# Patient Record
Sex: MALE | Race: OTHER | HISPANIC OR LATINO | Employment: FULL TIME | URBAN - NONMETROPOLITAN AREA
[De-identification: names, ages, dates, MRNs, and addresses within clinical notes are randomized per-mention and may not be internally consistent; named-entity substitution may affect disease eponyms.]

---

## 2018-04-06 ENCOUNTER — HOSPITAL ENCOUNTER (EMERGENCY)
Facility: HOSPITAL | Age: 23
Discharge: HOME | End: 2018-04-06
Admitting: EMERGENCY MEDICINE
Payer: COMMERCIAL

## 2018-04-06 VITALS
DIASTOLIC BLOOD PRESSURE: 58 MMHG | OXYGEN SATURATION: 100 % | HEART RATE: 62 BPM | SYSTOLIC BLOOD PRESSURE: 108 MMHG | RESPIRATION RATE: 18 BRPM | TEMPERATURE: 98.3 F | WEIGHT: 81.25 LBS

## 2018-04-06 DIAGNOSIS — R82.4 KETONURIA: ICD-10-CM

## 2018-04-06 DIAGNOSIS — E11.9 DIABETES MELLITUS, NEW ONSET (HCC): ICD-10-CM

## 2018-04-06 DIAGNOSIS — R81 GLUCOSURIA: ICD-10-CM

## 2018-04-06 DIAGNOSIS — R73.9 HYPERGLYCEMIA: Primary | ICD-10-CM

## 2018-04-06 LAB
ACETONE SERPL-MCNC: NEGATIVE MG/DL
ALBUMIN SERPL BCP-MCNC: 4.6 G/DL (ref 3.5–5)
ALP SERPL-CCNC: 107 U/L (ref 46–116)
ALT SERPL W P-5'-P-CCNC: 36 U/L (ref 12–78)
ANION GAP SERPL CALCULATED.3IONS-SCNC: 9 MMOL/L (ref 4–13)
APTT PPP: 28 SECONDS (ref 23–35)
AST SERPL W P-5'-P-CCNC: 14 U/L (ref 5–45)
ATRIAL RATE: 62 BPM
BACTERIA UR QL AUTO: ABNORMAL /HPF
BASE EX.OXY STD BLDV CALC-SCNC: 44.3 % (ref 60–80)
BASE EXCESS BLDV CALC-SCNC: 2.3 MMOL/L
BASOPHILS # BLD AUTO: 0.02 THOUSANDS/ΜL (ref 0–0.1)
BASOPHILS NFR BLD AUTO: 0 % (ref 0–1)
BILIRUB SERPL-MCNC: 1 MG/DL (ref 0.2–1)
BILIRUB UR QL STRIP: NEGATIVE
BUN SERPL-MCNC: 16 MG/DL (ref 5–25)
CALCIUM SERPL-MCNC: 9.8 MG/DL (ref 8.3–10.1)
CHLORIDE SERPL-SCNC: 101 MMOL/L (ref 100–108)
CLARITY UR: CLEAR
CO2 SERPL-SCNC: 31 MMOL/L (ref 21–32)
COLOR UR: YELLOW
CREAT SERPL-MCNC: 1.09 MG/DL (ref 0.6–1.3)
EOSINOPHIL # BLD AUTO: 0.15 THOUSAND/ΜL (ref 0–0.61)
EOSINOPHIL NFR BLD AUTO: 3 % (ref 0–6)
ERYTHROCYTE [DISTWIDTH] IN BLOOD BY AUTOMATED COUNT: 12.1 % (ref 11.6–15.1)
GFR SERPL CREATININE-BSD FRML MDRD: 94 ML/MIN/1.73SQ M
GLUCOSE SERPL-MCNC: 155 MG/DL (ref 65–140)
GLUCOSE SERPL-MCNC: 156 MG/DL (ref 65–140)
GLUCOSE SERPL-MCNC: 183 MG/DL (ref 65–140)
GLUCOSE UR STRIP-MCNC: ABNORMAL MG/DL
HCO3 BLDV-SCNC: 30 MMOL/L (ref 24–30)
HCT VFR BLD AUTO: 42 % (ref 36.5–49.3)
HGB BLD-MCNC: 15.2 G/DL (ref 12–17)
HGB UR QL STRIP.AUTO: NEGATIVE
INR PPP: 1.29 (ref 0.86–1.16)
KETONES UR STRIP-MCNC: ABNORMAL MG/DL
LEUKOCYTE ESTERASE UR QL STRIP: NEGATIVE
LIPASE SERPL-CCNC: 171 U/L (ref 73–393)
LYMPHOCYTES # BLD AUTO: 1.93 THOUSANDS/ΜL (ref 0.6–4.47)
LYMPHOCYTES NFR BLD AUTO: 37 % (ref 14–44)
MAGNESIUM SERPL-MCNC: 2.1 MG/DL (ref 1.6–2.6)
MCH RBC QN AUTO: 29.5 PG (ref 26.8–34.3)
MCHC RBC AUTO-ENTMCNC: 36.2 G/DL (ref 31.4–37.4)
MCV RBC AUTO: 82 FL (ref 82–98)
MONOCYTES # BLD AUTO: 0.34 THOUSAND/ΜL (ref 0.17–1.22)
MONOCYTES NFR BLD AUTO: 7 % (ref 4–12)
MUCOUS THREADS UR QL AUTO: ABNORMAL
NEUTROPHILS # BLD AUTO: 2.8 THOUSANDS/ΜL (ref 1.85–7.62)
NEUTS SEG NFR BLD AUTO: 53 % (ref 43–75)
NITRITE UR QL STRIP: NEGATIVE
NON-SQ EPI CELLS URNS QL MICRO: ABNORMAL /HPF
O2 CT BLDV-SCNC: 9.7 ML/DL
P AXIS: 17 DEGREES
PCO2 BLDV: 59 MM HG (ref 42–50)
PH BLDV: 7.32 [PH] (ref 7.3–7.4)
PH UR STRIP.AUTO: 5.5 [PH] (ref 4.5–8)
PLATELET # BLD AUTO: 219 THOUSANDS/UL (ref 149–390)
PMV BLD AUTO: 11.8 FL (ref 8.9–12.7)
PO2 BLDV: 24.3 MM HG (ref 35–45)
POTASSIUM SERPL-SCNC: 3.8 MMOL/L (ref 3.5–5.3)
PR INTERVAL: 150 MS
PROT SERPL-MCNC: 8.4 G/DL (ref 6.4–8.2)
PROT UR STRIP-MCNC: ABNORMAL MG/DL
PROTHROMBIN TIME: 16 SECONDS (ref 12.1–14.4)
QRS AXIS: 90 DEGREES
QRSD INTERVAL: 104 MS
QT INTERVAL: 410 MS
QTC INTERVAL: 416 MS
RBC # BLD AUTO: 5.15 MILLION/UL (ref 3.88–5.62)
RBC #/AREA URNS AUTO: ABNORMAL /HPF
SODIUM SERPL-SCNC: 141 MMOL/L (ref 136–145)
SP GR UR STRIP.AUTO: >=1.03 (ref 1–1.03)
T WAVE AXIS: 49 DEGREES
TROPONIN I SERPL-MCNC: <0.02 NG/ML
UROBILINOGEN UR QL STRIP.AUTO: 0.2 E.U./DL
VENTRICULAR RATE: 62 BPM
WBC # BLD AUTO: 5.24 THOUSAND/UL (ref 4.31–10.16)
WBC #/AREA URNS AUTO: ABNORMAL /HPF

## 2018-04-06 PROCEDURE — 83735 ASSAY OF MAGNESIUM: CPT | Performed by: PHYSICIAN ASSISTANT

## 2018-04-06 PROCEDURE — 80053 COMPREHEN METABOLIC PANEL: CPT | Performed by: PHYSICIAN ASSISTANT

## 2018-04-06 PROCEDURE — 93005 ELECTROCARDIOGRAM TRACING: CPT | Performed by: PHYSICIAN ASSISTANT

## 2018-04-06 PROCEDURE — 82948 REAGENT STRIP/BLOOD GLUCOSE: CPT

## 2018-04-06 PROCEDURE — 99285 EMERGENCY DEPT VISIT HI MDM: CPT

## 2018-04-06 PROCEDURE — 82805 BLOOD GASES W/O2 SATURATION: CPT | Performed by: PHYSICIAN ASSISTANT

## 2018-04-06 PROCEDURE — 36415 COLL VENOUS BLD VENIPUNCTURE: CPT | Performed by: PHYSICIAN ASSISTANT

## 2018-04-06 PROCEDURE — 85730 THROMBOPLASTIN TIME PARTIAL: CPT | Performed by: PHYSICIAN ASSISTANT

## 2018-04-06 PROCEDURE — 85025 COMPLETE CBC W/AUTO DIFF WBC: CPT | Performed by: PHYSICIAN ASSISTANT

## 2018-04-06 PROCEDURE — 84484 ASSAY OF TROPONIN QUANT: CPT | Performed by: PHYSICIAN ASSISTANT

## 2018-04-06 PROCEDURE — 93010 ELECTROCARDIOGRAM REPORT: CPT | Performed by: INTERNAL MEDICINE

## 2018-04-06 PROCEDURE — 85610 PROTHROMBIN TIME: CPT | Performed by: PHYSICIAN ASSISTANT

## 2018-04-06 PROCEDURE — 83690 ASSAY OF LIPASE: CPT | Performed by: PHYSICIAN ASSISTANT

## 2018-04-06 PROCEDURE — 81001 URINALYSIS AUTO W/SCOPE: CPT | Performed by: PHYSICIAN ASSISTANT

## 2018-04-06 PROCEDURE — 82009 KETONE BODYS QUAL: CPT | Performed by: PHYSICIAN ASSISTANT

## 2018-04-06 NOTE — DISCHARGE INSTRUCTIONS
Diabetes Mellitus Type 2 in Adults, Ambulatory Care   GENERAL INFORMATION:   Diabetes mellitus type 2  is a disease that affects how your body uses glucose (sugar)  Insulin helps move sugar out of the blood so it can be used for energy  Normally, when the blood sugar level increases, the pancreas makes more insulin  Type 2 diabetes develops because either the body cannot make enough insulin, or it cannot use the insulin correctly  After many years, your pancreas may stop making insulin  Common symptoms include the following:   · More hunger or thirst than usual     · Frequent urination     · Weight loss without trying     · Blurred vision  Seek immediate care for the following symptoms:   · Severe abdominal pain, or pain that spreads to your back  You may also be vomiting  · Trouble staying awake or focusing    · Shaking or sweating    · Blurred or double vision    · Breath has a fruity, sweet smell    · Breathing is deep and labored, or rapid and shallow    · Heartbeat is fast and weak  Treatment for diabetes mellitus type 2  includes keeping your blood sugar at a normal level  You must eat the right foods, and exercise regularly  You may also need medicine if you cannot control your blood sugar level with nutrition and exercise  Manage diabetes mellitus type 2:   · Check your blood sugar level  You will be taught how to check a small drop of blood in a glucose monitor  Ask your healthcare provider when and how often to check during the day  Ask your healthcare provider what your blood sugar levels should be when you check them  · Keep track of carbohydrates (sugar and starchy foods)  Your blood sugar level can get too high if you eat too many carbohydrates  Your dietitian will help you plan meals and snacks that have the right amount of carbohydrates  · Eat low-fat foods  Some examples are skinless chicken and low-fat milk  · Eat less sodium (salt)    Some examples of high-sodium foods to limit are soy sauce, potato chips, and soup  Do not add salt to food you cook  Limit your use of table salt  · Eat high-fiber foods  Foods that are a good source of fiber include vegetables, whole grain bread, and beans  · Limit alcohol  Alcohol affects your blood sugar level and can make it harder to manage your diabetes  Women should limit alcohol to 1 drink a day  Men should limit alcohol to 2 drinks a day  A drink of alcohol is 12 ounces of beer, 5 ounces of wine, or 1½ ounces of liquor  · Get regular exercise  Exercise can help keep your blood sugar level steady, decrease your risk of heart disease, and help you lose weight  Exercise for at least 30 minutes, 5 days a week  Include muscle strengthening activities 2 days each week  Work with your healthcare provider to create an exercise plan  · Check your feet each day  for injuries or open sores  Ask your healthcare provider for activities you can do if you have an open sore  · Quit smoking  If you smoke, it is never too late to quit  Smoking can worsen the problems that may occur with diabetes  Ask your healthcare provider for information about how to stop smoking if you are having trouble quitting  · Ask about your weight:  Ask healthcare providers if you need to lose weight, and how much to lose  Ask them to help you with a weight loss program  Even a 10 to 15 pound weight loss can help you manage your blood sugar level  · Carry medical alert identification  Wear medical alert jewelry or carry a card that says you have diabetes  Ask your healthcare provider where to get these items  · Ask about vaccines  Diabetes puts you at risk of serious illness if you get the flu, pneumonia, or hepatitis  Ask your healthcare provider if you should get a flu, pneumonia, or hepatitis B vaccine, and when to get the vaccine    Follow up with your healthcare provider as directed:  Write down your questions so you remember to ask them during your visits  CARE AGREEMENT:   You have the right to help plan your care  Learn about your health condition and how it may be treated  Discuss treatment options with your caregivers to decide what care you want to receive  You always have the right to refuse treatment  The above information is an  only  It is not intended as medical advice for individual conditions or treatments  Talk to your doctor, nurse or pharmacist before following any medical regimen to see if it is safe and effective for you  © 2014 [address] is for End User's use only and may not be sold, redistributed or otherwise used for commercial purposes  All illustrations and images included in CareNotes® are the copyrighted property of [employer]  or [employer]  10% - bad control"> 10% - bad control,Hemoglobin A1c (HbA1c) greater than 10% indicating poor diabetic control,Haemoglobin A1c greater than 10% indicating poor diabetic control">   Diabetes mellitus tipo 2 en adultos, cuidados ambulatorios   INFORMACIÓN GENERAL:   La diabetes mellitus tipo 2 en adultos  es jocelyn enfermedad que afecta la forma en que el cuerpo utiliza la glucosa (azúcar)  La insulina ayuda a extraer el azúcar de la liat para que pueda usarse en la producción de energía  Generalmente, cuando el nivel de azúcar [place], el páncreas produce más insulina  La diabetes tipo 2 se desarrolla ya sea porque el cuerpo no puede producir suficiente insulina, o no la puede usar correctamente  Después de [employer], lopez páncreas podría dejar de producir insulina  Síntomas comunes incluyen los siguientes:   · Más hambre o sed de la usual    · Necesidad frecuente de orinar     · Pérdida de peso sin tratar     · Visión borrosa  Busque cuidados inmediatos para los siguientes síntomas:   · Dolor abdominal severo o dolor que se propaga hacia lopez espalda  Es probable que usted también vomite      · Dificultad para permanecer despierto o concentrado    · Temblores o sudoración    · Visión borrosa o doble    · Aliento con olor a frutas o julius    · Respiración profunda y dificultosa o rápida y superficial    · Ritmo cardíaco rápido y débil  El tratamiento para la diabetes mellitus tipo 2  incluye mantener lopez nivel de azúcar en el liat a un rango normal  Usted debe comer los alimentos correctos y ejercitarse con regularidad  Además es probable que necesite medicamentos si no puede controlar lopez nivel de azúcar en la liat con nutrición y ejercicio  Maneje la diabetes mellitus tipo 2:   · Revise lopez nivel de azúcar en la liat  A usted le enseñarán cómo revisar jocelyn pequeña gota de [place] en un medidor de glucosa  Pregúntele a lopez proveedor de sergey cuándo y con cuánta frecuencia es necesario revisar vandana el día  También pregúntele a lopez proveedor de sergey cuáles deberían ser ana niveles de azúcar en la liat cuando usted se los [place]  · Mantenga un registro de los carbohidratos (azúcares y almidones)  Lopez nivel de azúcar en la liat puede elevarse demasiado si usted come demasiados carbohidratos  Lopez dietista le ayudará a planear comidas y meriendas que tengan la cantidad correcta de carbohidratos  · Consuma alimentos bajos en grasas  Deliah Mola son el melba sin piel y la leche descremada  · Consuma menos sodio (sal)  Algunos ejemplos de alimentos altos en sodio que hay que limitar son la salsa de soya, las dali tostadas y la sopa  No le agregue sal a la comida que usted cocina  Limite lopez uso de sal de robles  · Coma alimentos altos en fibra  Alimentos que son buena jeannie de fibra incluyen los vegetales, el pan integral y los frijoles  · Limite el alcohol  El alcohol afecta lopez nivel de azúcar en la liat y puede dificultar el [place] de lopez diabetes  Las mujeres deben limitar el consumo de alcohol a 1 bebida al día  Los hombres deben limitarlo a 2 debidas al día   Jocelyn bebida de alcohol equivale a 12 onzas de cerveza, 5 onzas de vino o 1½ onzas de licor  · Ejercítese regularmente  El ejercicio puede ayudar a mantener estable lopez nivel de azúcar en la liat, al mismo tiempo que disminuye lopez riesgo de enfermedad cardíaca y le ayuda a perder peso  Ejercítese por al menos 30 minutos, 5 días a la semana  Saralyn Mc de fortalecimiento muscular 2 días a la semana  Colabore con lopez proveedor de sergey para crear un plan de ejercicios  · Revise ana pies a diario  para sesar si tienen heridas o llagas abiertas  Pregúntele a lopez proveedor de [employer] que usted puede hacer si tiene jocelyn llaga abierta  · Deje de fumar  Si usted fuma, nunca es tarde para dejar de hacerlo  El fumar puede [employer] problemas que puedan ocurrir con la diabetes  Pregúntele a lopez proveedor de [employer] para aprender a dejar de fumar si usted tiene dificultad para hacerlo  · Pregunte sobre lopez peso:  Pregúntele a ana proveedores de sergey si usted necesita perder peso y cuánto debe perder  Pídales que le ayuden con un programa de perdida de [place]  Incluso perder unas 10 a 15 libras puede ayudarle a manejar lopez nivel de azúcar en la liat  · Tenga a mano lopez identificación de [employer]  Use un brazalete de alerta médica o lleve consigo jocelyn tarjeta que diga que usted tiene diabetes  Pregúntele a lopez proveedor de sergey dónde conseguir estos artículos  · Pregunte sobre vacunas  La diabetes lo pone a usted en riesgo de enfermedad seria si usted se resfría, tiene neumonía o hepatitis  Pregúntele a lopez proveedor de sergey si usted debe ponerse las vacunas contra la gripe, neumonía o hepatitis B, y cuándo ponérselas  Programe jocelyn olivier con lopez proveedor de [employer] se le haya indicado: Anote ana preguntas para que se acuerde de hacerlas vandana ana visitas  ACUERDOS SOBRE LOPEZ CUIDADO:   Usted tiene el derecho de participar en la planificación de lopez cuidado   Aprenda todo lo que pueda sobre lopez condición y inder darle tratamiento  Discuta con ana médicos ana opciones de tratamiento para juntos decidir el cuidado que usted quiere recibir  Usted siempre tiene el derecho a rechazar lopez tratamiento  Esta información es sólo para uso en educación  Lopez intención no es darle un consejo médico sobre enfermedades o tratamientos  Colsulte con lopez Zelpha Roch farmacéutico antes de seguir cualquier régimen médico para saber si es seguro y efectivo para usted  © 2014 [address] is for End User's use only and may not be sold, redistributed or otherwise used for commercial purposes  All illustrations and images included in CareNotes® are the copyrighted property of [employer] M [employer] Inc  or Miky Snyder

## 2018-04-06 NOTE — ED PROVIDER NOTES
History  Chief Complaint   Patient presents with    Hyperglycemia - Symptomatic     last week bs running in 200's  today fbs  was 400 and pt having blurred vision if voiding frequently  not diabetic to this point     58-year-old previously healthy male presents with elevated blood sugars and signs symptoms of diabetes x1 month  There is a family history of patient's father with type 2 diabetes, patient has been using his father's glucometer to check his blood sugars, has noted blood sugars in the 200 for the past several weeks checking them sporadically not daily  Today when he woke up it was 400 which prompted him to come to ED today  He has never had a low BS reading  Over this time frame he has also developed intermittent blurred vision, severe thirst, urinating frequently and in large volumes  He denies any nausea vomiting or diarrhea  He denies any chest pain or cough  He has no belly pain  He has no dizziness or headaches  He has a dry rash on his neck and shoulders that is mild no wounds particularly on his feet groin or axilla  He does not smoke or drink alcohol  No IV drug use  No current meds  History provided by:  Patient  Hyperglycemia - Symptomatic   Associated symptoms: fatigue, increased thirst and polyuria    Associated symptoms: no abdominal pain, no chest pain, no diaphoresis, no dizziness, no dysuria, no fever, no nausea, no shortness of breath, no vomiting and no weakness        None       History reviewed  No pertinent past medical history  History reviewed  No pertinent surgical history  History reviewed  No pertinent family history  I have reviewed and agree with the history as documented  Social History   Substance Use Topics    Smoking status: Never Smoker    Smokeless tobacco: Never Used    Alcohol use Yes      Comment: ocassional        Review of Systems   Constitutional: Positive for fatigue   Negative for activity change, appetite change, chills, diaphoresis, fever and unexpected weight change  HENT: Negative for congestion, ear pain, rhinorrhea, sinus pressure, sore throat and tinnitus  Eyes: Negative for visual disturbance  Respiratory: Negative for cough, chest tightness, shortness of breath and wheezing  Cardiovascular: Negative for chest pain, palpitations and leg swelling  Gastrointestinal: Negative for abdominal pain, constipation, diarrhea, nausea and vomiting  Endocrine: Positive for polydipsia, polyphagia and polyuria  Negative for cold intolerance and heat intolerance  Genitourinary: Negative for decreased urine volume, difficulty urinating, dysuria, flank pain, frequency, hematuria and urgency  Musculoskeletal: Negative for arthralgias, back pain and myalgias  Skin: Negative for rash and wound  Neurological: Negative for dizziness, tremors, syncope, weakness and headaches  Physical Exam  ED Triage Vitals [04/06/18 1430]   Temperature Pulse Respirations Blood Pressure SpO2   98 3 °F (36 8 °C) 72 16 134/70 100 %      Temp Source Heart Rate Source Patient Position - Orthostatic VS BP Location FiO2 (%)   Temporal Right Sitting Right arm --      Pain Score       No Pain           Orthostatic Vital Signs  Vitals:    04/06/18 1430 04/06/18 1745   BP: 134/70 108/58   Pulse: 72 62   Patient Position - Orthostatic VS: Sitting Lying       Physical Exam   Constitutional: He is oriented to person, place, and time  Vital signs are normal  He appears well-developed and well-nourished  Non-toxic appearance  No distress  HENT:   Head: Normocephalic and atraumatic  Right Ear: Tympanic membrane and external ear normal    Left Ear: Tympanic membrane and external ear normal    Nose: Nose normal  No rhinorrhea  Mouth/Throat: Uvula is midline and oropharynx is clear and moist    Eyes: Conjunctivae, EOM and lids are normal  Pupils are equal, round, and reactive to light  Right eye exhibits no discharge  Left eye exhibits no discharge  Neck: Normal range of motion and full passive range of motion without pain  Neck supple  No JVD present  No thyromegaly present  Cardiovascular: Normal rate, regular rhythm, normal heart sounds and intact distal pulses  No murmur heard  Pulmonary/Chest: Effort normal and breath sounds normal  No accessory muscle usage  No tachypnea  No respiratory distress  He has no wheezes  He has no rales  He exhibits no tenderness  Abdominal: Soft  Normal appearance and bowel sounds are normal  He exhibits no distension  There is no hepatosplenomegaly  There is no tenderness  There is no rebound and no CVA tenderness  No hernia  Musculoskeletal: Normal range of motion  He exhibits no edema or tenderness  Lymphadenopathy:     He has no cervical adenopathy  Neurological: He is alert and oriented to person, place, and time  No cranial nerve deficit or sensory deficit  Skin: Skin is warm, dry and intact  Capillary refill takes less than 2 seconds  Rash (mild acanthosis nape of neck, slightly raised brown/red, dry) noted  He is not diaphoretic  No erythema  No pallor  Psychiatric: He has a normal mood and affect  His speech is normal and behavior is normal    Nursing note and vitals reviewed        ED Medications  Medications - No data to display    Diagnostic Studies  Results Reviewed     Procedure Component Value Units Date/Time    Comprehensive metabolic panel [[id number]]  (Abnormal) Collected:  04/06/18 1716    Lab Status:  Final result Specimen:  Blood from Arm, Right Updated:  04/06/18 6670     Sodium 141 mmol/L      Potassium 3 8 mmol/L      Chloride 101 mmol/L      CO2 31 mmol/L      Anion Gap 9 mmol/L      BUN 16 mg/dL      Creatinine 1 09 mg/dL      Glucose 155 (H) mg/dL      Calcium 9 8 mg/dL      AST 14 U/L      ALT 36 U/L      Alkaline Phosphatase 107 U/L      Total Protein 8 4 (H) g/dL      Albumin 4 6 g/dL      Total Bilirubin 1 00 mg/dL      eGFR 94 ml/min/1 73sq m     Narrative:         Consolidated [employer] [facility] Education [facility] recommendations are as follows:  GFR calculation is accurate only with a steady state creatinine  Chronic Kidney disease less than 60 ml/min/1 73 sq  meters  Kidney failure less than 15 ml/min/1 73 sq  meters  Lipase [[id number]]  (Normal) Collected:  04/06/18 1716    Lab Status:  Final result Specimen:  Blood from Arm, Right Updated:  04/06/18 1753     Lipase 171 u/L     Magnesium [[id number]]  (Normal) Collected:  04/06/18 1716    Lab Status:  Final result Specimen:  Blood from Arm, Right Updated:  04/06/18 1753     Magnesium 2 1 mg/dL     Troponin I [[id number]]  (Normal) Collected:  04/06/18 1716    Lab Status:  Final result Specimen:  Blood from Arm, Right Updated:  04/06/18 1747     Troponin I <0 02 ng/mL     Narrative:         [employer] Chemistry analyzer 99% cutoff is > 0 04 ng/mL in network labs    o cTnI 99% cutoff is useful only when applied to patients in the clinical setting of myocardial ischemia  o cTnI 99% cutoff should be interpreted in the context of clinical history, ECG findings and possibly cardiac imaging to establish correct diagnosis  o cTnI 99% cutoff may be suggestive but clearly not indicative of a coronary event without the clinical setting of myocardial ischemia  Urine Microscopic [[id number]]  (Abnormal) Collected:  04/06/18 1717    Lab Status:  Final result Specimen:  Urine from Urine, Clean Catch Updated:  04/06/18 1744     RBC, UA 0-1 (A) /hpf      WBC, UA 0-1 (A) /hpf      Epithelial Cells Occasional /hpf      Bacteria, UA Occasional /hpf      MUCOUS THREADS Moderate    Protime-INR [[id number]]  (Abnormal) Collected:  04/06/18 1716    Lab Status:  Final result Specimen:  Blood from Arm, Right Updated:  04/06/18 1741     Protime 16 0 (H) seconds      INR 1 29 (H)    APTT [[id number]]  (Normal) Collected:  04/06/18 1716    Lab Status:  Final result Specimen:  Blood from Arm, Right Updated:  04/06/18 1741     PTT 28 seconds     Narrative:          Therapeutic Heparin Range = 60-90 seconds    Acetone [[id number]]  (Normal) Collected:  04/06/18 1716    Lab Status:  Final result Specimen:  Blood from Arm, Right Updated:  04/06/18 1737     Acetone, Bld Negative    UA w Reflex to Microscopic w Reflex to Culture [[id number]]  (Abnormal) Collected:  04/06/18 1717    Lab Status:  Final result Specimen:  Urine from Urine, Clean Catch Updated:  04/06/18 1733     Color, UA Yellow     Clarity, UA Clear     Specific Gravity, UA >=1 030     pH, UA 5 5     Leukocytes, UA Negative     Nitrite, UA Negative     Protein, UA Trace (A) mg/dl      Glucose,  (1/2%) (A) mg/dl      Ketones, UA 15 (1+) (A) mg/dl      Urobilinogen, UA 0 2 E U /dl      Bilirubin, UA Negative     Blood, UA Negative    Blood gas, venous [[id number]]  (Abnormal) Collected:  04/06/18 1716    Lab Status:  Final result Specimen:  Blood from Arm, Right Updated:  04/06/18 1731     pH, Faraz 7 324     pCO2, Faraz 59 0 (H) mm Hg      pO2, Faraz 24 3 (L) mm Hg      HCO3, Faraz 30 0 mmol/L      Base Excess, Faraz 2 3 mmol/L      O2 Content, Faraz 9 7 ml/dL      O2 HGB, VENOUS 44 3 (L) %     CBC and differential [[id number]]  (Normal) Collected:  04/06/18 1716    Lab Status:  Final result Specimen:  Blood from Arm, Right Updated:  04/06/18 1729     WBC 5 24 Thousand/uL      RBC 5 15 Million/uL      Hemoglobin 15 2 g/dL      Hematocrit 42 0 %      MCV 82 fL      MCH 29 5 pg      MCHC 36 2 g/dL      RDW 12 1 %      MPV 11 8 fL      Platelets 117 Thousands/uL      Neutrophils Relative 53 %      Lymphocytes Relative 37 %      Monocytes Relative 7 %      Eosinophils Relative 3 %      Basophils Relative 0 %      Neutrophils Absolute 2 80 Thousands/µL      Lymphocytes Absolute 1 93 Thousands/µL      Monocytes Absolute 0 34 Thousand/µL      Eosinophils Absolute 0 15 Thousand/µL      Basophils Absolute 0 02 Thousands/µL     Fingerstick Glucose (POCT) [[id number]]  (Abnormal) Collected:  04/06/18 1722    Lab Status:  Final result Updated:  04/06/18 1725 POC Glucose 156 (H) mg/dl     Fingerstick Glucose (POCT) [[id number]]  (Abnormal) Collected:  04/06/18 1434    Lab Status:  Final result Updated:  04/06/18 1711     POC Glucose 183 (H) mg/dl                  No orders to display              Procedures  ECG 12 Lead Documentation  Date/Time: 4/6/2018 5:40 PM  Performed by: Joseline Smart  Authorized by: Joseline Smart     Indications / Diagnosis:  Hyperglycemia  ECG reviewed by me, the ED Provider: yes    Patient location:  ED  Rate:     ECG rate:  62  Rhythm:     Rhythm: sinus rhythm    Ectopy:     Ectopy: none    QRS:     QRS axis:  Normal  Conduction:     Conduction: normal    ST segments:     ST segments:  Normal  T waves:     T waves: normal             Phone Contacts  ED Phone Contact    ED Course  ED Course as of Apr 06 1956 Fri Apr 06, 2018   1736 POC Glucose: (!) 156   1736 pH, Faraz: 7 324   1736 Color, UA: Yellow   1736 pH, UA: 5 5   1736 Leukocytes, UA: Negative   1736 Nitrite, UA: Negative   1736 Protein, UA: (!) Trace   1736 Glucose, UA: (!) 500 (1/2%)   1736 Ketones, UA: (!) 15 (1+)   1736 Blood, UA: Negative   1736 WBC: 5 24   1736 Hemoglobin: 15 2   1736 Platelets: 986   1411 Magnesium: 2 1   1808 Lipase: 171   1808 Sodium: 141   1808 Potassium: 3 8   1808 Chloride: 101   1808 CO2: 31   1808 Anion Gap: 9   1808 BUN: 16   1808 Creatinine: 1 09   1808 Glucose: (!) 155   1808 eGFR: 94   1808 Acetone, Bld: Negative   1808 Troponin I: <0 02   1818 Delay in IVF due to other critical patients, pt eating and drinking can orally hydrate  Pt would not like to stay for IV hydration  We reviewed together all lab results today, treatment to start on PO metformin  He is to establish care with pcp in the [place] area per his preference  RTED with n/v/d belly pain fevers nonhealing wounds chest pain short of breath or unable to wake  Pt and family understand and agree to plan          MDM  Number of Diagnoses or Management Options  Diabetes mellitus, new onset [facility]): new and requires workup  Glucosuria: new and requires workup  Hyperglycemia: new and requires workup  Ketonuria: new and requires workup  Diagnosis management comments: New onset of hyperglycemia with s/x c/w new onset diabetes  No prior low BS readings  Admits to poor diet "junk food/soda" encouraged to cut unnecessary sugars from his diet  Will start on metformin 500mg bid  Referrals to pcp for close f/u in ~1 week to establish and continue care  No s/sx of dka or acidemia  Is eating drinking normally no pain no other associated electrolyte disturbance  Appropriate for o/p treatment  Amount and/or Complexity of Data Reviewed  Clinical lab tests: ordered and reviewed  Obtain history from someone other than the patient: yes  Discuss the patient with other providers: yes    Patient Progress  Patient progress: stable    CritCare Time    Disposition  Final diagnoses:   Hyperglycemia   Glucosuria   Ketonuria   [facility]     Time reflects when diagnosis was documented in both MDM as applicable and the Disposition within this note     Time User Action Codes Description Comment    4/6/2018  6:20 PM Placido Vaughan [R73 9] Hyperglycemia     4/6/2018  6:20 PM Jodine Bad     4/6/2018  6:20 PM Placido Vaughan [R82 [id number]] Ketonuria     4/6/2018  6:21 PM Katja Hough [E11 9] Diabetes mellitus, new onset [facility])       ED Disposition     ED Disposition Condition Comment    Discharge  [address] discharge to home/self care      Condition at discharge: Good        Follow-up Information     Follow up With Specialties Details Why Contact Info    [employer]  Call To establish [phone]      Gina Dunlap PA-C Physician Assistant Schedule an appointment as soon as possible for a visit in 1 week To establish PCP [address] [place] [address] [id number] [place], [place] [address] Nurse Practitioner Schedule an appointment as soon as possible for a visit in 1 week To establish PCP [facility] [id number]  [phone]          Discharge Medication List as of 4/6/2018  6:49 PM      START taking these medications    Details   metFORMIN (GLUCOPHAGE) 500 mg tablet Take 1 tablet (500 mg total) by mouth 2 (two) times a day with meals for 30 days, Starting Fri 4/6/2018, Until Sun 5/6/2018, Print           No discharge procedures on file      ED Provider  Electronically Signed by           Adriana Nolan PA-C  04/06/18 1956

## 2018-04-26 ENCOUNTER — OFFICE VISIT (OUTPATIENT)
Dept: FAMILY MEDICINE CLINIC | Facility: CLINIC | Age: 23
End: 2018-04-26
Payer: COMMERCIAL

## 2018-04-26 VITALS
WEIGHT: 165 LBS | DIASTOLIC BLOOD PRESSURE: 80 MMHG | HEART RATE: 74 BPM | SYSTOLIC BLOOD PRESSURE: 130 MMHG | OXYGEN SATURATION: 99 % | TEMPERATURE: 97.9 F

## 2018-04-26 DIAGNOSIS — E11.8 TYPE 2 DIABETES MELLITUS WITH COMPLICATION, WITHOUT LONG-TERM CURRENT USE OF INSULIN (HCC): Primary | ICD-10-CM

## 2018-04-26 LAB
GLUCOSE SERPL-MCNC: 312 MG/DL (ref 65–140)
SL AMB  POCT GLUCOSE, UA: ABNORMAL
SL AMB LEUKOCYTE ESTERASE,UA: NEGATIVE
SL AMB POCT BILIRUBIN,UA: NEGATIVE
SL AMB POCT BLOOD,UA: NEGATIVE
SL AMB POCT CLARITY,UA: ABNORMAL
SL AMB POCT COLOR,UA: YELLOW
SL AMB POCT GLUCOSE BLD: 312
SL AMB POCT HEMOGLOBIN AIC: 11.7
SL AMB POCT KETONES,UA: ABNORMAL
SL AMB POCT NITRITE,UA: NEGATIVE
SL AMB POCT PH,UA: 6.5
SL AMB POCT SPECIFIC GRAVITY,UA: 1.01
SL AMB POCT URINE PROTEIN: NEGATIVE
SL AMB POCT UROBILINOGEN: 0.2

## 2018-04-26 PROCEDURE — 99213 OFFICE O/P EST LOW 20 MIN: CPT | Performed by: FAMILY MEDICINE

## 2018-04-26 PROCEDURE — 82948 REAGENT STRIP/BLOOD GLUCOSE: CPT | Performed by: FAMILY MEDICINE

## 2018-04-26 PROCEDURE — 81002 URINALYSIS NONAUTO W/O SCOPE: CPT | Performed by: FAMILY MEDICINE

## 2018-04-26 PROCEDURE — 83036 HEMOGLOBIN GLYCOSYLATED A1C: CPT | Performed by: FAMILY MEDICINE

## 2018-04-26 RX ORDER — BLOOD-GLUCOSE METER
EACH MISCELLANEOUS
Qty: 1 KIT | Refills: 0 | Status: SHIPPED | OUTPATIENT
Start: 2018-04-26 | End: 2021-11-05 | Stop reason: SDUPTHER

## 2018-04-26 NOTE — PROGRESS NOTES
Assessment/Plan:     Diagnoses and all orders for this visit:    Type 2 diabetes mellitus with complication, without long-term current use of insulin (HCC)  -     POCT hemoglobin A1c  -     POCT blood glucose  -     CBC and differential; Future  -     Comprehensive metabolic panel; Future  -     TSH, 3rd generation with T4 reflex; Future  -     Lipid Panel with Direct LDL reflex; Future  -     Cyclic citrul peptide antibody, IgG; Future  -     Insulin, fasting; Future  -     Glutamic acid decarboxylase; Future  -     POCT urine dip  -     Blood Glucose Monitoring Suppl (ONETOUCH VERIO) w/Device KIT; USE AS DIRECTED  -     glucose blood (ONETOUCH VERIO) test strip; Use as instructed Test blood sugars 3 times daily  -     ONETOUCH DELICA LANCETS FINE MISC; USE AS DIRECTED THREE TIMES DAILY    Other orders  -     Fingerstick Glucose (POCT)      Discussion/Plan:  Diabetes - A1C 11 7  No signs/sx of DKA/acidosis  Continue Metformin 500mg BID with meals  Dietary advice given with handouts on diabetic meal planning  Will obtain labs and manage medication once results available  Patient instructed to get labs done tomorrow morning  Educated on DKA signs/symptoms and advised patient to seek ED if signs/sx develop  Blood glucose testing supplies sent to pharmacy  Advised patient to test 3x/daily, records results, and bring to his f/u appointment  - F/U in 1 month or sooner if needed  Will give referral for opthamology and  diabetic foot exam     Subjective:      Patient ID: Chris Byrne is a 23 y o  male  Chief Complaint   Patient presents with   [address] Diabetes     Was diagnosed with diabetes at ER  Patient is a 23year old male who presents to the office today to establish care and for ED f/u  Patient presented to there ED on 4/6/18 with symptomatic hyperglycemia  He had been checking blood sugars at home with his father's glucometer, who has Type 2 Diabetes   He reported that sugars were elevated in 200s sporadically and a reading of 400 prompted ED visit  He was experiencing intermittent blurred vision, excessive thirst and urination  Work up revealed hyperglycemia/new onset type 2 diabetes, no signs of acidosis/DKA  He was discharged on Metformin 500mg BID and was advised to improve diet  Currently, patient lives in [facility] with his mother and brothers  Patient states that he has been monitoring blood sugar at home once daily and they are always running in 300s-400s, with a reading of 495 two days ago  Patient has been taking Metformin 500mg BID  Patient has also changed his diet, he states he is eating vegetables and not eating as much rice/bread  Patient states he is asymptomatic at this time, but sugars are running higher  Patient states that he weighed 179 lbs when he went to the hospital  He is now 165 lbs in the office today  Patient denies medical history of any problems, no surgical history  Patient reports father and uncle have "bad" diabetes  High cholesterol also runs in the family  The following portions of the patient's history were reviewed and updated as appropriate: allergies, current medications, past family history, past medical history, past social history, past surgical history and problem list     Review of Systems   Constitutional: Positive for unexpected weight change  Eyes: Positive for visual disturbance  Respiratory: Negative  Cardiovascular: Negative  Endocrine: Positive for polydipsia and polyuria  Objective:      /80 (BP Location: Left arm, Patient Position: Sitting, Cuff Size: Large)   Pulse 74   Temp 97 9 °F (36 6 °C) (Tympanic)   Wt 74 8 kg (165 lb)   SpO2 99%     BP recheck - 118/80       Physical Exam   Constitutional: He appears well-developed and well-nourished  HENT:   Head: Normocephalic and atraumatic     Right Ear: External ear normal    Left Ear: External ear normal    Nose: Nose normal    Mouth/Throat: Oropharynx is clear and moist    Eyes: Conjunctivae are normal  Pupils are equal, round, and reactive to light  Neck: Neck supple  Cardiovascular: Normal rate and regular rhythm  Pulmonary/Chest: Effort normal and breath sounds normal    Abdominal: Soft  Bowel sounds are normal  There is no tenderness  Neurological: He is alert  Skin: Skin is warm  Psychiatric: He has a normal mood and affect   His behavior is normal

## 2018-05-16 DIAGNOSIS — E11.9 DIABETES MELLITUS, NEW ONSET (HCC): Primary | ICD-10-CM

## 2018-05-17 DIAGNOSIS — E11.9 DIABETES MELLITUS, NEW ONSET (HCC): ICD-10-CM

## 2018-05-17 LAB
CHOLEST SERPL-MCNC: 154 MG/DL (ref 50–200)
CHOLEST/HDLC SERPL: 3.85 {RATIO}
EXT DIFF-ABS BASOPHILS: 0.02
EXT DIFF-ABS EOSINOPHILS: 0.2
EXT DIFF-ABS LYMPHOCYTES: 1.3
EXT DIFF-ABS MONOCYTES: 0.2
EXT DIFF-ABS NEUTROPHILS: NORMAL
EXT GLUCOSE BLD: 219
EXTERNAL ALBUMIN: 5
EXTERNAL ALK PHOS: 116
EXTERNAL ALT: 22
EXTERNAL ANION GAP: ABNORMAL
EXTERNAL AST: 16
EXTERNAL BICARBONATE: ABNORMAL
EXTERNAL BUN: 15
EXTERNAL CALCIUM: 10.2
EXTERNAL CHLORIDE: 98
EXTERNAL CREATININE: 1.06
EXTERNAL EGFR: 91
EXTERNAL HEMATOCRIT: 45 %
EXTERNAL HEMOGLOBIN: 15.1 G/DL
EXTERNAL MCV: 87
EXTERNAL PLATELET COUNT: 193 K/ΜL
EXTERNAL POTASSIUM: 3.9
EXTERNAL RBC: 5.12
EXTERNAL RDW: 29.5
EXTERNAL SODIUM: 136
EXTERNAL T.BILIRUBIN: 1.1
EXTERNAL TOTAL PROTEIN: 7.5
EXTERNAL WBC: 3.9
HDLC SERPL-MCNC: 40 MG/DL (ref 40–60)
INSULIN SERPL-ACNC: 2 MU/L (ref 3–25)
LDL/HDL RATIO (HISTORICAL): 2.33
LDLC SERPL DIRECT ASSAY-MCNC: 93 MG/DL
LDLC SERPL DIRECT ASSAY-MCNC: 94 MG/DL (ref 0–100)
NONHDLC SERPL-MCNC: 114 MG/DL
T4 SERPL-MCNC: 10.28 UG/DL
TRIGL SERPL-MCNC: 103 MG/DL (ref ?–150)
TSH SERPL DL<=0.05 MIU/L-ACNC: 0.49 UIU/ML (ref 0.36–3.74)
VLDLC SERPL-MCNC: 21 MG/DL

## 2018-05-22 DIAGNOSIS — E10.65 TYPE 1 DIABETES MELLITUS WITH HYPERGLYCEMIA (HCC): Primary | ICD-10-CM

## 2018-05-22 DIAGNOSIS — E11.8 TYPE 2 DIABETES MELLITUS WITH COMPLICATION, WITHOUT LONG-TERM CURRENT USE OF INSULIN (HCC): ICD-10-CM

## 2018-05-22 NOTE — PROGRESS NOTES
Labs reviewed with Dr Bj Heredia  Start Lantus 15 units at bedtime daily and Humalog short acting three times daily with meals per sliding scale  Monitor BS four times times daily with meals and at bedtime    Refer to endocrinology for management

## 2018-05-24 ENCOUNTER — OFFICE VISIT (OUTPATIENT)
Dept: FAMILY MEDICINE CLINIC | Facility: CLINIC | Age: 23
End: 2018-05-24
Payer: COMMERCIAL

## 2018-05-24 VITALS
HEIGHT: 72 IN | TEMPERATURE: 98.6 F | OXYGEN SATURATION: 98 % | DIASTOLIC BLOOD PRESSURE: 64 MMHG | SYSTOLIC BLOOD PRESSURE: 115 MMHG | BODY MASS INDEX: 22.48 KG/M2 | HEART RATE: 56 BPM | WEIGHT: 166 LBS | RESPIRATION RATE: 12 BRPM

## 2018-05-24 DIAGNOSIS — E10.9 TYPE 1 DIABETES MELLITUS WITHOUT COMPLICATION (HCC): ICD-10-CM

## 2018-05-24 DIAGNOSIS — E10.65 TYPE 1 DIABETES MELLITUS WITH HYPERGLYCEMIA (HCC): ICD-10-CM

## 2018-05-24 PROCEDURE — 99213 OFFICE O/P EST LOW 20 MIN: CPT | Performed by: FAMILY MEDICINE

## 2018-05-24 NOTE — LETTER
May 24, 2018     Patient: Reynold Adams   YOB: 1995   Date of Visit: 5/24/2018       To Whom it May Concern:    Reynold Adams is under my professional care  He was seen in my office on 5/24/2018  He may return to work on 5/28/18 with limitations  Please excuse patient from work on 5/24/18  I personally explained to patient that it was medically necessary to come to office  Patient is able to return to work on 5/28/18 with restriction  Patient is able to perform duties, but will need to leave work if experiencing hypoglycemia and patient will need to check blood sugar every 4 hours  If you have any questions or concerns, please don't hesitate to call           Sincerely,          Roxanne Oneill PA-C        CC: No Recipients

## 2018-05-24 NOTE — PROGRESS NOTES
Assessment/Plan:     Diagnoses and all orders for this visit:    Type 1 diabetes mellitus with hyperglycemia (HCC)  -     insulin glargine (LANTUS SOLOSTAR) injection pen 100 units/mL; Inject 0 3 mL (30 Units total) under the skin daily at bedtime  -     insulin lispro (HumaLOG) 100 Units/mL SOPN; Inject 10 Units under the skin 3 (three) times a day with meals Inject under the skin three times daily with meals per sliding scale    Type 1 diabetes mellitus without complication (HCC)  -     glucose blood (ONETOUCH VERIO) test strip; Use as instructed Test blood sugars 4 times daily with meals and at bedtime  -     ONETOUCH DELICA LANCETS FINE MISC; USE AS DIRECTED THREE TIMES DAILY       Discussion/Plan:  Type 1 Diabetes with hyperglycemia - start lantus 30 units at bedtime and humalog 10 units TID with meals and sliding scale  Glucose testing supplies sent to pharmacy, test blood glucose 4 times daily  Records glucose readings and bring to next visit  Educated patient on how to inject insulin and test blood glucose  Hypoglycemia signs, symptoms, readings, and management discussed  F/U with endocrinology  RTC in 2 weeks for blood sugar evaluation or sooner if needed  Subjective:      Patient ID: Malcolm Mcdermott is a 23 y o  male  Chief Complaint   Patient presents with    Diabetes     New onset DM, go over how to use insulin       Patient presents to the office today for lab review and counseling on how to use insulin  He is a new onset diabetic, likely type 1  Endocrinology was consulted and insulin was recommended along with referral  Patient states he is feeling good, no complaints/concerns  Patient states sugar was 319 today  He needs documentation for work also  He is eating healthy and exercising           The following portions of the patient's history were reviewed and updated as appropriate: allergies, current medications, past family history, past medical history, past social history, past surgical history and problem list     Review of Systems   Constitutional: Negative  Eyes: Negative  Respiratory: Negative  Cardiovascular: Negative  Gastrointestinal: Negative  Genitourinary: Negative  Musculoskeletal: Negative  Neurological: Negative  Psychiatric/Behavioral: Negative  Objective:      /64 (BP Location: Left arm, Patient Position: Sitting, Cuff Size: Standard)   Pulse 56   Temp 98 6 °F (37 °C) (Tympanic)   Resp 12   Ht 6' (1 829 m)   Wt 75 3 kg (166 lb)   SpO2 98%   BMI 22 51 kg/m²          Physical Exam   Constitutional: He is oriented to person, place, and time  He appears well-developed and well-nourished  HENT:   Head: Normocephalic and atraumatic  Right Ear: Tympanic membrane, external ear and ear canal normal    Left Ear: Tympanic membrane, external ear and ear canal normal    Nose: Nose normal    Mouth/Throat: Oropharynx is clear and moist    Eyes: Conjunctivae are normal  Pupils are equal, round, and reactive to light  Neck: Neck supple  No thyromegaly present  Cardiovascular: Normal rate, regular rhythm and normal heart sounds  Pulmonary/Chest: Effort normal and breath sounds normal    Abdominal: Soft  Bowel sounds are normal  There is no tenderness  Lymphadenopathy:     He has no cervical adenopathy  Neurological: He is alert and oriented to person, place, and time  Skin: Skin is warm and dry  Psychiatric: He has a normal mood and affect   His behavior is normal

## 2018-05-29 DIAGNOSIS — E10.8 TYPE 1 DIABETES MELLITUS WITH COMPLICATION (HCC): Primary | ICD-10-CM

## 2018-06-17 DIAGNOSIS — E10.9 TYPE 1 DIABETES MELLITUS WITHOUT COMPLICATION (HCC): ICD-10-CM

## 2018-06-18 RX ORDER — BLOOD SUGAR DIAGNOSTIC
STRIP MISCELLANEOUS
Qty: 100 EACH | Refills: 1 | Status: SHIPPED | OUTPATIENT
Start: 2018-06-18 | End: 2018-07-11 | Stop reason: SDUPTHER

## 2018-06-22 ENCOUNTER — DOCUMENTATION (OUTPATIENT)
Dept: FAMILY MEDICINE CLINIC | Facility: CLINIC | Age: 23
End: 2018-06-22

## 2018-07-06 ENCOUNTER — OFFICE VISIT (OUTPATIENT)
Dept: FAMILY MEDICINE CLINIC | Facility: CLINIC | Age: 23
End: 2018-07-06
Payer: COMMERCIAL

## 2018-07-06 VITALS
DIASTOLIC BLOOD PRESSURE: 82 MMHG | OXYGEN SATURATION: 99 % | SYSTOLIC BLOOD PRESSURE: 129 MMHG | WEIGHT: 183 LBS | TEMPERATURE: 98.2 F | HEART RATE: 72 BPM | BODY MASS INDEX: 24.82 KG/M2

## 2018-07-06 DIAGNOSIS — Z79.4 OTHER SPECIFIED DIABETES MELLITUS WITH COMPLICATION, WITH LONG-TERM CURRENT USE OF INSULIN: Primary | ICD-10-CM

## 2018-07-06 DIAGNOSIS — E13.8 OTHER SPECIFIED DIABETES MELLITUS WITH COMPLICATION, WITH LONG-TERM CURRENT USE OF INSULIN: Primary | ICD-10-CM

## 2018-07-06 PROBLEM — E11.9 DIABETES MELLITUS (HCC): Status: ACTIVE | Noted: 2018-07-06

## 2018-07-06 PROCEDURE — 99213 OFFICE O/P EST LOW 20 MIN: CPT | Performed by: FAMILY MEDICINE

## 2018-07-06 NOTE — PROGRESS NOTES
Assessment/Plan:     Diagnoses and all orders for this visit:    Other specified diabetes mellitus with complication, with long-term current use of insulin ([facility] [address] )  -     Comprehensive metabolic panel; Future  -     CBC and differential; Future      Discussion/Plan:  Diabetes mellitus - repeat CBC/CMP ordered  A1C 8 8 today, down from 11 7 on 4/26/18  Will have pt decrease lantus to 25 units at bedtime and novolog 5 units TID with meals secondary to hypoglycemic episodes  Patient is to continue to monitor blood sugar four times daily and call office on Monday with blood sugar readings  Continue healthy diet and daily exercise  RTC in 6 weeks for blood sugar evaluation or sooner if needed  Subjective:      Patient ID: Samuel Monahan is a 23 y o  male  Patient presents to the office today for diabetic f/u  A1C 8 8 today, down from 11 7 at last visit  Patient has had a few low blood sugar episodes during the day at work, low as 40  He states his highest blood sugar was 205 that happened once over the past 3 months  He states he has had to decrease lantus to 20 units at bedtime on occasion and sometimes will skip novolog dose due to low blood sugar  He is overall feeling better, has had 18 lb weight gain  He is eating and sleeping well           The following portions of the patient's history were reviewed and updated as appropriate: allergies, current medications, past family history, past medical history, past social history, past surgical history and problem list     Patient Active Problem List   Diagnosis    [facility]     Current Outpatient Prescriptions on File Prior to Visit   Medication Sig Dispense Refill    Blood Glucose Monitoring Suppl (ONETOUCH VERIO) w/Device KIT USE AS DIRECTED 1 kit 0    insulin aspart (NOVOLOG FLEXPEN) 100 Units/mL injection pen Inject 10 Units under the skin 3 (three) times a day with meals 5 pen 5    insulin glargine (BASAGLAR KWIKPEN) 100 units/mL injection pen Inject 30 Units under the skin daily at bedtime 5 pen 5    Insulin Pen Needle 32G X 6 MM MISC Use four times daily 100 each 1    ONETOUCH DELICA LANCETS FINE MISC USE AS DIRECTED THREE TIMES DAILY 100 each 3    ONETOUCH VERIO test strip TEST BLOOD SUGAR 4 TIMES A DAY BEFORE MEALS AND AT BEDTIME 100 each 1     No current facility-administered medications on file prior to visit  Review of Systems   Constitutional: Negative  Respiratory: Negative  Cardiovascular: Negative  Gastrointestinal: Negative  Genitourinary: Negative  Neurological: Negative  Psychiatric/Behavioral: Negative  Objective:      /82   Pulse 72   Temp 98 2 °F (36 8 °C)   Wt 83 kg (183 lb)   SpO2 99%   BMI 24 82 kg/m²          Physical Exam   Constitutional: He is oriented to person, place, and time  He appears well-developed and well-nourished  HENT:   Head: Normocephalic and atraumatic  Right Ear: Tympanic membrane, external ear and ear canal normal    Left Ear: Tympanic membrane, external ear and ear canal normal    Nose: Nose normal    Mouth/Throat: Oropharynx is clear and moist    Eyes: Conjunctivae are normal  Pupils are equal, round, and reactive to light  Neck: Neck supple  No thyromegaly present  Cardiovascular: Normal rate, regular rhythm and normal heart sounds  Pulmonary/Chest: Effort normal and breath sounds normal    Abdominal: Soft  Bowel sounds are normal  There is no tenderness  Lymphadenopathy:     He has no cervical adenopathy  Neurological: He is alert and oriented to person, place, and time  Skin: Skin is warm and dry  Psychiatric: He has a normal mood and affect   His behavior is normal  Judgment and thought content normal

## 2018-07-11 DIAGNOSIS — E10.9 TYPE 1 DIABETES MELLITUS WITHOUT COMPLICATION (HCC): ICD-10-CM

## 2018-07-12 DIAGNOSIS — E10.9 TYPE 1 DIABETES MELLITUS WITHOUT COMPLICATION (HCC): ICD-10-CM

## 2018-07-13 DIAGNOSIS — E10.9 TYPE 1 DIABETES MELLITUS WITHOUT COMPLICATION (HCC): ICD-10-CM

## 2018-08-17 DIAGNOSIS — E10.9 TYPE 1 DIABETES MELLITUS WITHOUT COMPLICATION (HCC): ICD-10-CM

## 2018-08-30 ENCOUNTER — OFFICE VISIT (OUTPATIENT)
Dept: FAMILY MEDICINE CLINIC | Facility: CLINIC | Age: 23
End: 2018-08-30
Payer: COMMERCIAL

## 2018-08-30 VITALS
TEMPERATURE: 96.3 F | HEIGHT: 72 IN | RESPIRATION RATE: 16 BRPM | BODY MASS INDEX: 26.41 KG/M2 | SYSTOLIC BLOOD PRESSURE: 110 MMHG | OXYGEN SATURATION: 99 % | HEART RATE: 64 BPM | WEIGHT: 195 LBS | DIASTOLIC BLOOD PRESSURE: 78 MMHG

## 2018-08-30 DIAGNOSIS — E10.8 TYPE 1 DIABETES MELLITUS WITH COMPLICATION (HCC): ICD-10-CM

## 2018-08-30 DIAGNOSIS — E10.9 TYPE 1 DIABETES MELLITUS WITHOUT COMPLICATION (HCC): Primary | ICD-10-CM

## 2018-08-30 DIAGNOSIS — E10.65 TYPE 1 DIABETES MELLITUS WITH HYPERGLYCEMIA (HCC): ICD-10-CM

## 2018-08-30 LAB — SL AMB POCT HEMOGLOBIN AIC: 6.1

## 2018-08-30 PROCEDURE — 83036 HEMOGLOBIN GLYCOSYLATED A1C: CPT | Performed by: FAMILY MEDICINE

## 2018-08-30 PROCEDURE — 99213 OFFICE O/P EST LOW 20 MIN: CPT | Performed by: FAMILY MEDICINE

## 2018-08-30 NOTE — PROGRESS NOTES
Assessment/Plan:     Diagnoses and all orders for this visit:    Type 1 diabetes mellitus without complication (HCC)  -     POCT hemoglobin A1c  -     ONETOUCH DELICA LANCETS FINE MISC; USE AS DIRECTED THREE TIMES DAILY  -     glucose blood (ONETOUCH VERIO) test strip; Test blood sugars 4 times a day before meals and at bedtime Disp 90 day supply    Type 1 diabetes mellitus with complication (HCC)  -     insulin aspart (NOVOLOG FLEXPEN) 100 Units/mL injection pen; Per sliding scale  -     insulin glargine (BASAGLAR KWIKPEN) 100 units/mL injection pen; Inject 10 Units under the skin daily at bedtime    Type 1 diabetes mellitus with hyperglycemia (HCC)  -     Insulin Pen Needle 32G X 6 MM MISC; Use four times daily      Discussion/Plan:  Type 1 Diabetes - A1C 6 1 today  Will decrease basaglar to 10 units at bedtime and novolog to sliding scale only  Sliding scale information given  Continue to monitor blood sugar 4 times daily  Continue healthy diet and physical activity  Labs reviewed and wnl  RTC 4 months or sooner if needed  Subjective:      Patient ID: Todd Gan is a 23 y o  male  Chief Complaint   Patient presents with    Diabetes       Patient is a 23year old male who presents to the office today for diabetic f/u  He is doing well without any complaints/concerns  His A1C is 6 1 today  He is taking basaglar 15-20 units at bedtime and if sugar is low, he will not take insulin  He sometimes will take novolog during the day, but sugars have been good  He follows healthy diet and is physically active daily           The following portions of the patient's history were reviewed and updated as appropriate: allergies, current medications, past family history, past medical history, past social history, past surgical history and problem list   Patient Active Problem List   Diagnosis    Diabetes mellitus ([facility] [address] )     Current Outpatient Prescriptions on File Prior to Visit   Medication Sig Dispense Refill    Blood Glucose Monitoring Suppl (ONETOUCH VERIO) w/Device KIT USE AS DIRECTED 1 kit 0    [DISCONTINUED] glucose blood (ONETOUCH VERIO) test strip Test blood sugars 4 times a day before meals and at bedtime Disp 90 day supply 400 each 1    [DISCONTINUED] insulin aspart (NOVOLOG FLEXPEN) 100 Units/mL injection pen Inject 10 Units under the skin 3 (three) times a day with meals 5 pen 5    [DISCONTINUED] insulin glargine (BASAGLAR KWIKPEN) 100 units/mL injection pen Inject 30 Units under the skin daily at bedtime 5 pen 5    [DISCONTINUED] Insulin Pen Needle 32G X 6 MM MISC Use four times daily 100 each 1    [DISCONTINUED] ONETOUCH DELICA LANCETS FINE MISC USE AS DIRECTED THREE TIMES DAILY 300 each 0     No current facility-administered medications on file prior to visit  Review of Systems   Constitutional: Negative  Respiratory: Negative  Cardiovascular: Negative  Gastrointestinal: Negative  Genitourinary: Negative  Psychiatric/Behavioral: Negative  Objective:      /78   Pulse 64   Temp (!) 96 3 °F (35 7 °C)   Resp 16   Ht 6' (1 829 m)   Wt 88 5 kg (195 lb)   SpO2 99%   BMI 26 45 kg/m²          Physical Exam   Constitutional: He is oriented to person, place, and time  He appears well-developed and well-nourished  Eyes: Conjunctivae are normal  Pupils are equal, round, and reactive to light  Neck: Neck supple  No thyromegaly present  Cardiovascular: Normal rate and regular rhythm  Pulmonary/Chest: Effort normal and breath sounds normal    Abdominal: Soft  Bowel sounds are normal  There is no tenderness  Neurological: He is alert and oriented to person, place, and time  Skin: Skin is warm and dry  Psychiatric: He has a normal mood and affect   His behavior is normal

## 2018-09-19 DIAGNOSIS — E10.8 TYPE 1 DIABETES MELLITUS WITH COMPLICATION (HCC): ICD-10-CM

## 2019-03-01 ENCOUNTER — TELEPHONE (OUTPATIENT)
Dept: FAMILY MEDICINE CLINIC | Facility: CLINIC | Age: 24
End: 2019-03-01

## 2019-03-04 ENCOUNTER — OFFICE VISIT (OUTPATIENT)
Dept: FAMILY MEDICINE CLINIC | Facility: CLINIC | Age: 24
End: 2019-03-04
Payer: COMMERCIAL

## 2019-03-04 VITALS
RESPIRATION RATE: 16 BRPM | HEIGHT: 72 IN | SYSTOLIC BLOOD PRESSURE: 100 MMHG | DIASTOLIC BLOOD PRESSURE: 74 MMHG | BODY MASS INDEX: 25.87 KG/M2 | TEMPERATURE: 96.3 F | HEART RATE: 69 BPM | OXYGEN SATURATION: 99 % | WEIGHT: 191 LBS

## 2019-03-04 DIAGNOSIS — Z23 NEED FOR PNEUMOCOCCAL VACCINATION: ICD-10-CM

## 2019-03-04 DIAGNOSIS — E10.8 TYPE 1 DIABETES MELLITUS WITH COMPLICATION (HCC): ICD-10-CM

## 2019-03-04 DIAGNOSIS — E11.9 DIABETES MELLITUS WITHOUT COMPLICATION (HCC): Primary | ICD-10-CM

## 2019-03-04 DIAGNOSIS — Z23 NEED FOR TDAP VACCINATION: ICD-10-CM

## 2019-03-04 LAB — SL AMB POCT HEMOGLOBIN AIC: 5.8 (ref ?–6.5)

## 2019-03-04 PROCEDURE — 90471 IMMUNIZATION ADMIN: CPT | Performed by: FAMILY MEDICINE

## 2019-03-04 PROCEDURE — 99213 OFFICE O/P EST LOW 20 MIN: CPT | Performed by: FAMILY MEDICINE

## 2019-03-04 PROCEDURE — 90670 PCV13 VACCINE IM: CPT

## 2019-03-04 PROCEDURE — 90472 IMMUNIZATION ADMIN EACH ADD: CPT | Performed by: FAMILY MEDICINE

## 2019-03-04 PROCEDURE — 83036 HEMOGLOBIN GLYCOSYLATED A1C: CPT | Performed by: FAMILY MEDICINE

## 2019-03-04 RX ORDER — ATORVASTATIN CALCIUM 10 MG/1
10 TABLET, FILM COATED ORAL
Qty: 30 TABLET | Refills: 5 | Status: SHIPPED | OUTPATIENT
Start: 2019-03-04 | End: 2019-07-25 | Stop reason: DRUGHIGH

## 2019-03-04 NOTE — PROGRESS NOTES
History and Physical  Conchis Schwab 20 y o  male MRN: [id number]      Assessment:   DM    Plan:  Continue current meds  Add Lipitor 10 mg QHS  Pneumovax and TDAP today  Labs as ordered  RTC 3 months    Chief Complaint   Patient presents with    Diabetes        HPI:  Conchis Schwab is a 20 y o  male who presents for DM follow up   needed  BS average 100-120 per pt  Has had a couple of low sugars and a couple of high sugras  Does not always eat properly due to working 12 hour shifts  No other complaints today  Historical Information   Past Medical History:   Diagnosis Date    Diabetes mellitus ([address] )      No past surgical history on file  Social History   Social History     Substance and Sexual Activity   Alcohol Use Yes    Comment: ocassional     Social History     Substance and Sexual Activity   Drug Use No     Social History     Tobacco Use   Smoking Status Never Smoker   Smokeless Tobacco Never Used     Family History   Problem Relation Age of Onset    Diabetes Father        Meds/Allergies   No Known Allergies    Meds:    Current Outpatient Medications:     Blood Glucose Monitoring Suppl ([employer]) w/Device KIT, USE AS DIRECTED, Disp: 1 kit, Rfl: 0    glucose blood (ONETOUCH VERIO) test strip, Test blood sugars 4 times a day before meals and at bedtime Disp 90 day supply, Disp: 400 each, Rfl: 2    insulin aspart (NOVOLOG FLEXPEN) 100 Units/mL injection pen, Per sliding scale, Disp: 15 pen, Rfl: 3    insulin glargine (BASAGLAR KWIKPEN) 100 units/mL injection pen, Inject 10 Units under the skin daily at bedtime, Disp: 15 pen, Rfl: 0    Insulin Pen Needle 32G X 6 MM MISC, Use four times daily, Disp: 200 each, Rfl: 2    ONETOUCH DELICA LANCETS FINE MISC, USE AS DIRECTED THREE TIMES DAILY, Disp: 300 each, Rfl: 2      REVIEW OF SYSTEMS  Review of Systems   Constitutional: Negative  HENT: Negative  Eyes: Negative  Respiratory: Negative  Cardiovascular: Negative  Gastrointestinal: Negative  Endocrine: Negative  Genitourinary: Negative  Musculoskeletal: Negative  Skin: Negative  Allergic/Immunologic: Negative  Neurological: Negative  Hematological: Negative  Psychiatric/Behavioral: Negative  Current Vitals:   Blood Pressure: 100/74 (03/04/19 1035)  Pulse: 69 (03/04/19 1035)  Temperature: (!) 96 3 °F (35 7 °C) (03/04/19 1035)  Respirations: 16 (03/04/19 1035)  Height: 6' (182 9 cm) (03/04/19 1035)  Weight - Scale: 86 6 kg (191 lb) (03/04/19 1035)  SpO2: 99 % (03/04/19 1035)  Body mass index is 25 9 kg/m²  PHYSICAL EXAMS:  Physical Exam   Constitutional: He is oriented to person, place, and time  He appears well-developed and well-nourished  HENT:   Head: Normocephalic and atraumatic  Right Ear: External ear normal    Left Ear: External ear normal    Eyes: Pupils are equal, round, and reactive to light  Neck: Normal range of motion  Neck supple  No thyromegaly present  Cardiovascular: Normal rate, regular rhythm and normal heart sounds  Pulses are no weak pulses  Pulses:       Dorsalis pedis pulses are 1+ on the right side, and 1+ on the left side  Posterior tibial pulses are 1+ on the right side, and 1+ on the left side  Pulmonary/Chest: Effort normal and breath sounds normal  He has no wheezes  He has no rales  Abdominal: Soft  Bowel sounds are normal  There is no tenderness  Musculoskeletal: Normal range of motion  He exhibits no edema  Feet:   Right Foot:   Skin Integrity: Negative for ulcer, skin breakdown, erythema, warmth, callus or dry skin  Left Foot:   Skin Integrity: Negative for ulcer, skin breakdown, erythema, warmth, callus or dry skin  Neurological: He is alert and oriented to person, place, and time  No cranial nerve deficit  Skin: Skin is warm and dry  Capillary refill takes less than 2 seconds  Psychiatric: He has a normal mood and affect  Nursing note and vitals reviewed    Patient's shoes and socks removed  Right Foot/Ankle   Right Foot Inspection  Skin Exam: skin normal and skin intact no dry skin, no warmth, no callus, no erythema, no maceration, no abnormal color, no pre-ulcer, no ulcer and no callus                          Toe Exam: ROM and strength within normal limits  Sensory   Vibration: intact  Proprioception: intact   Monofilament testing: intact  Vascular  Capillary refills: < 3 seconds  The right DP pulse is 1+  The right PT pulse is 1+  Left Foot/Ankle  Left Foot Inspection  Skin Exam: skin normal and skin intactno dry skin, no warmth, no erythema, no maceration, normal color, no pre-ulcer, no ulcer and no callus                         Toe Exam: ROM and strength within normal limits                   Sensory   Vibration: intact  Proprioception: intact  Monofilament: intact  Vascular  Capillary refills: < 3 seconds  The left DP pulse is 1+  The left PT pulse is 1+  Assign Risk Category:  No deformity present; No loss of protective sensation; No weak pulses       Risk: 0      Lab Results:          Sincere Rasheed PA-C  3/4/2019, 10:41 AM

## 2019-07-18 ENCOUNTER — OFFICE VISIT (OUTPATIENT)
Dept: FAMILY MEDICINE CLINIC | Facility: CLINIC | Age: 24
End: 2019-07-18
Payer: COMMERCIAL

## 2019-07-18 VITALS
TEMPERATURE: 98.2 F | WEIGHT: 192 LBS | HEIGHT: 72 IN | HEART RATE: 72 BPM | DIASTOLIC BLOOD PRESSURE: 78 MMHG | BODY MASS INDEX: 26.01 KG/M2 | SYSTOLIC BLOOD PRESSURE: 102 MMHG | RESPIRATION RATE: 18 BRPM | OXYGEN SATURATION: 98 %

## 2019-07-18 DIAGNOSIS — Z79.4 OTHER SPECIFIED DIABETES MELLITUS WITH COMPLICATION, WITH LONG-TERM CURRENT USE OF INSULIN: Primary | ICD-10-CM

## 2019-07-18 DIAGNOSIS — E13.8 OTHER SPECIFIED DIABETES MELLITUS WITH COMPLICATION, WITH LONG-TERM CURRENT USE OF INSULIN: Primary | ICD-10-CM

## 2019-07-18 LAB — SL AMB POCT HEMOGLOBIN AIC: 6.4 (ref ?–6.5)

## 2019-07-18 PROCEDURE — 99213 OFFICE O/P EST LOW 20 MIN: CPT | Performed by: FAMILY MEDICINE

## 2019-07-18 PROCEDURE — 83036 HEMOGLOBIN GLYCOSYLATED A1C: CPT | Performed by: FAMILY MEDICINE

## 2019-07-19 NOTE — PROGRESS NOTES
Assessment/Plan:    John Magana is a 20years old male with type 1 diabetes presents with 3-4 weeks hypoglycemia with glucose range of 40-60 around 4:00 a m  Jaime Bolton Patient has being taking 15 units or 20 units of insulin glargine at bedtime  He does not use any NovoLog  Informed patient to use only 15 units at bedtime and keep a log of glucose reading in the morning, before meals, and bedtime  Follow-up in 1 week to review glucose level and adjust insulin as needed  Point of care HbA1c today was 6 4  Advised patient to schedule for eye exam   Reprinted lab works including microalbumin/creatinine ratio, TSH, lipid panel, CBC and CMP given to patient  Follow-up results at next visit  Case discussed with Dr Melanie Kearney  Diagnoses and all orders for this visit:    Other specified diabetes mellitus with complication, with long-term current use of [facility]  -     POCT hemoglobin A1c        Subjective:      Patient ID: John Magana is a 20 y o  male  Patient is a 20years old male with type 1 diabetes presents with 3-4 weeks duration of low blood sugar  Patient usually wake up around 330 in the morning and noticed blood sugar has been in range of 40-60  Associated with dizziness  Denying any nausea, vomiting, abdominal pain, chills  He reports taking 15 units or 20 units of insulin glargine at bedtime (9:30pm) depending on his glucose range during the day time  He usually checks his glucose level after eating and has been in the range of 170-200 mg/dL, sometime reported up to glu of 300 mg/dL  He works at the [employer] house with 12 hours daily shift from 4:00 a m  To 4:00 p m  He reports going 8 hours without eating while working  He was diagnosed with type 1 diabetes last year April 2018  Initial Hb A1c was 11 7 on April 2018, improved to 5 8 on March 2019  He denies any blurry vision, numbness or tingling sensation in extremities    He has a glucometer at home however he does not log his glucose number  The following portions of the patient's history were reviewed and updated as appropriate: allergies, current medications, past family history, past medical history, past social history, past surgical history and problem list     Review of Systems   Constitutional: Negative for chills and fever  HENT: Negative for congestion and sore throat  Eyes: Negative for visual disturbance  Respiratory: Negative for cough and shortness of breath  Cardiovascular: Negative for chest pain  Gastrointestinal: Negative for abdominal pain, nausea and vomiting  Genitourinary: Negative for dysuria  Musculoskeletal: Negative for back pain  Neurological: Positive for dizziness  Negative for headaches  Psychiatric/Behavioral: Negative for confusion  Objective:      /78   Pulse 72   Temp 98 2 °F (36 8 °C)   Resp 18   Ht 6' (1 829 m)   Wt 87 1 kg (192 lb)   SpO2 98%   BMI 26 04 kg/m²          Physical Exam   Constitutional: He is oriented to person, place, and time  He appears well-developed  No distress  HENT:   Head: Normocephalic  Mouth/Throat: Oropharynx is clear and moist  No oropharyngeal exudate  Eyes: Pupils are equal, round, and reactive to light  Right eye exhibits no discharge  Left eye exhibits no discharge  No scleral icterus  Neck: Normal range of motion  Cardiovascular: Normal rate, regular rhythm and normal heart sounds  No murmur heard  Pulmonary/Chest: Effort normal and breath sounds normal  No respiratory distress  He has no wheezes  He exhibits no tenderness  Abdominal: Soft  Bowel sounds are normal  He exhibits no distension  There is no tenderness  Musculoskeletal: Normal range of motion  He exhibits no edema or tenderness  Lymphadenopathy:     He has no cervical adenopathy  Neurological: He is alert and oriented to person, place, and time  Skin: Skin is warm  Psychiatric: He has a normal mood and affect  Vitals reviewed

## 2019-07-22 ENCOUNTER — APPOINTMENT (OUTPATIENT)
Dept: LAB | Facility: MEDICAL CENTER | Age: 24
End: 2019-07-22
Payer: COMMERCIAL

## 2019-07-22 DIAGNOSIS — E11.9 DIABETES MELLITUS WITHOUT COMPLICATION (HCC): ICD-10-CM

## 2019-07-22 LAB
ALBUMIN SERPL BCP-MCNC: 4.2 G/DL (ref 3.5–5)
ALP SERPL-CCNC: 94 U/L (ref 46–116)
ALT SERPL W P-5'-P-CCNC: 26 U/L (ref 12–78)
ANION GAP SERPL CALCULATED.3IONS-SCNC: 2 MMOL/L (ref 4–13)
AST SERPL W P-5'-P-CCNC: 14 U/L (ref 5–45)
BILIRUB SERPL-MCNC: 0.64 MG/DL (ref 0.2–1)
BUN SERPL-MCNC: 13 MG/DL (ref 5–25)
CALCIUM SERPL-MCNC: 9.2 MG/DL (ref 8.3–10.1)
CHLORIDE SERPL-SCNC: 102 MMOL/L (ref 100–108)
CHOLEST SERPL-MCNC: 187 MG/DL (ref 50–200)
CO2 SERPL-SCNC: 30 MMOL/L (ref 21–32)
CREAT SERPL-MCNC: 1.05 MG/DL (ref 0.6–1.3)
CREAT UR-MCNC: 298 MG/DL
ERYTHROCYTE [DISTWIDTH] IN BLOOD BY AUTOMATED COUNT: 12 % (ref 11.6–15.1)
GFR SERPL CREATININE-BSD FRML MDRD: 98 ML/MIN/1.73SQ M
GLUCOSE P FAST SERPL-MCNC: 91 MG/DL (ref 65–99)
HCT VFR BLD AUTO: 44.4 % (ref 36.5–49.3)
HDLC SERPL-MCNC: 49 MG/DL (ref 40–60)
HGB BLD-MCNC: 14.6 G/DL (ref 12–17)
LDLC SERPL CALC-MCNC: 109 MG/DL (ref 0–100)
MCH RBC QN AUTO: 28.9 PG (ref 26.8–34.3)
MCHC RBC AUTO-ENTMCNC: 32.9 G/DL (ref 31.4–37.4)
MCV RBC AUTO: 88 FL (ref 82–98)
MICROALBUMIN UR-MCNC: 15.3 MG/L (ref 0–20)
MICROALBUMIN/CREAT 24H UR: 5 MG/G CREATININE (ref 0–30)
NONHDLC SERPL-MCNC: 138 MG/DL
PLATELET # BLD AUTO: 224 THOUSANDS/UL (ref 149–390)
PMV BLD AUTO: 10.3 FL (ref 8.9–12.7)
POTASSIUM SERPL-SCNC: 3.6 MMOL/L (ref 3.5–5.3)
PROT SERPL-MCNC: 8.1 G/DL (ref 6.4–8.2)
RBC # BLD AUTO: 5.06 MILLION/UL (ref 3.88–5.62)
SODIUM SERPL-SCNC: 134 MMOL/L (ref 136–145)
TRIGL SERPL-MCNC: 146 MG/DL
TSH SERPL DL<=0.05 MIU/L-ACNC: 0.79 UIU/ML (ref 0.36–3.74)
WBC # BLD AUTO: 4.82 THOUSAND/UL (ref 4.31–10.16)

## 2019-07-22 PROCEDURE — 80061 LIPID PANEL: CPT

## 2019-07-22 PROCEDURE — 84443 ASSAY THYROID STIM HORMONE: CPT

## 2019-07-22 PROCEDURE — 85027 COMPLETE CBC AUTOMATED: CPT

## 2019-07-22 PROCEDURE — 82570 ASSAY OF URINE CREATININE: CPT

## 2019-07-22 PROCEDURE — 80053 COMPREHEN METABOLIC PANEL: CPT

## 2019-07-22 PROCEDURE — 36415 COLL VENOUS BLD VENIPUNCTURE: CPT

## 2019-07-22 PROCEDURE — 82043 UR ALBUMIN QUANTITATIVE: CPT

## 2019-07-25 ENCOUNTER — OFFICE VISIT (OUTPATIENT)
Dept: FAMILY MEDICINE CLINIC | Facility: CLINIC | Age: 24
End: 2019-07-25
Payer: COMMERCIAL

## 2019-07-25 VITALS
SYSTOLIC BLOOD PRESSURE: 110 MMHG | BODY MASS INDEX: 26.01 KG/M2 | RESPIRATION RATE: 16 BRPM | HEIGHT: 72 IN | WEIGHT: 192 LBS | DIASTOLIC BLOOD PRESSURE: 68 MMHG | HEART RATE: 77 BPM | OXYGEN SATURATION: 99 % | TEMPERATURE: 98.4 F

## 2019-07-25 DIAGNOSIS — E10.8 TYPE 1 DIABETES MELLITUS WITH COMPLICATION (HCC): Primary | ICD-10-CM

## 2019-07-25 PROCEDURE — 99213 OFFICE O/P EST LOW 20 MIN: CPT | Performed by: FAMILY MEDICINE

## 2019-07-25 RX ORDER — ATORVASTATIN CALCIUM 40 MG/1
40 TABLET, FILM COATED ORAL DAILY
Qty: 30 TABLET | Refills: 2 | Status: SHIPPED | OUTPATIENT
Start: 2019-07-25 | End: 2020-06-11 | Stop reason: SDUPTHER

## 2019-07-25 NOTE — PROGRESS NOTES
Assessment/Plan:    Gerda Pickard he is a 20years old male with type 1 diabetes without complication presents today for follow-up of glucose level  Last Hb A1c was 6 4 on July 18, 2019  Patient glucose range 128-217 before bedtime  Patient denies any further episodes of dizziness, nausea since he has been using 15 units of glargine at bedtime  Counseled patient on logging his glucose level upon waking up, before meals, and after meals  Referral to Optometry given to patient  Handout on diabetes education given the patient  Patient has LDL of 109 with currently on Lipitor 10 mg p o  Daily  Increased to high intensity statin with Lipitor 40 mg p o  Daily  Return to office in 1 month for next follow-up  All questions answered and addressed  Case discussed with Dr Yao Herron  Diagnoses and all orders for this visit:    Type 1 diabetes mellitus with complication ([facility] [address] )  -     Ambulatory referral to Optometry; Future  -     atorvastatin (LIPITOR) 40 mg tablet; Take 1 tablet (40 mg total) by mouth daily  -     insulin glargine (BASAGLAR KWIKPEN) 100 units/mL injection pen; Inject 15 Units under the skin daily at bedtime        Subjective:      Patient ID: Gerda Pickard is a 20 y o  male  Patient is a 20years old male with type 1 diabetes without complication presented today for follow-up on his glucose level  Patient was accompanied by his sister  Patient showed me his glucose level taken before bedtime around 10:00 p m  Range of 128-217  Patient states he has been taking 15 units of glargine before bedtime  He denies any further weakness in the body or nausea or vomiting or dizziness  He does report feeling tired all the time  He sleeps around 6-7 hours every night and takes 2 hours nap after work  He feels refreshed when waking up in the morning  He reports glucose of 84 prior coming to the office  Sister reports strong family history of diabetes and hyperlipidemia       Lipid panel done on July 22, 2019 revealed LDL of 109  Patient is currently on Lipitor 10 mg report without any side effect  Patient has not make appointment with an eye doctor  Referral to Optometry will be given  Patient has no other complaint at this time  The following portions of the patient's history were reviewed and updated as appropriate: allergies, current medications, past family history, past medical history, past social history, past surgical history and problem list     Review of Systems   Constitutional: Negative for chills and fever  HENT: Negative for congestion and sore throat  Eyes: Negative for visual disturbance  Respiratory: Negative for cough and shortness of breath  Cardiovascular: Negative for chest pain  Gastrointestinal: Negative for abdominal pain, nausea and vomiting  Genitourinary: Negative for dysuria  Musculoskeletal: Negative for back pain  Neurological: Negative for dizziness and light-headedness  Psychiatric/Behavioral: Negative for confusion  Objective: There were no vitals taken for this visit  Physical Exam   Constitutional: He is oriented to person, place, and time  He appears well-developed  No distress  HENT:   Head: Normocephalic  Mouth/Throat: Oropharynx is clear and moist  No oropharyngeal exudate  Eyes: Pupils are equal, round, and reactive to light  Right eye exhibits no discharge  Left eye exhibits no discharge  Neck: Normal range of motion  Cardiovascular: Normal rate, regular rhythm and normal heart sounds  No murmur heard  Pulmonary/Chest: Effort normal and breath sounds normal  No respiratory distress  He has no wheezes  Abdominal: Soft  Bowel sounds are normal  He exhibits no distension  There is no tenderness  Musculoskeletal: Normal range of motion  He exhibits no edema or tenderness  Lymphadenopathy:     He has no cervical adenopathy  Neurological: He is alert and oriented to person, place, and time     Skin: Skin is warm  Psychiatric: He has a normal mood and affect  Vitals reviewed

## 2020-04-29 DIAGNOSIS — E10.8 TYPE 1 DIABETES MELLITUS WITH COMPLICATION (HCC): ICD-10-CM

## 2020-05-14 ENCOUNTER — TELEPHONE (OUTPATIENT)
Dept: FAMILY MEDICINE CLINIC | Facility: CLINIC | Age: 25
End: 2020-05-14

## 2020-05-14 DIAGNOSIS — E10.8 TYPE 1 DIABETES MELLITUS WITH COMPLICATION (HCC): ICD-10-CM

## 2020-06-04 ENCOUNTER — NURSE TRIAGE (OUTPATIENT)
Dept: OTHER | Facility: OTHER | Age: 25
End: 2020-06-04

## 2020-06-09 DIAGNOSIS — E10.8 TYPE 1 DIABETES MELLITUS WITH COMPLICATION (HCC): ICD-10-CM

## 2020-06-11 ENCOUNTER — OFFICE VISIT (OUTPATIENT)
Dept: FAMILY MEDICINE CLINIC | Facility: CLINIC | Age: 25
End: 2020-06-11
Payer: COMMERCIAL

## 2020-06-11 VITALS
HEART RATE: 56 BPM | WEIGHT: 180 LBS | OXYGEN SATURATION: 98 % | TEMPERATURE: 97.6 F | SYSTOLIC BLOOD PRESSURE: 122 MMHG | DIASTOLIC BLOOD PRESSURE: 74 MMHG | BODY MASS INDEX: 24.38 KG/M2 | RESPIRATION RATE: 18 BRPM | HEIGHT: 72 IN

## 2020-06-11 DIAGNOSIS — Z13.5 SCREENING FOR DIABETIC RETINOPATHY: ICD-10-CM

## 2020-06-11 DIAGNOSIS — Z11.4 SCREENING FOR HIV (HUMAN IMMUNODEFICIENCY VIRUS): ICD-10-CM

## 2020-06-11 DIAGNOSIS — E10.65 TYPE 1 DIABETES MELLITUS WITH HYPERGLYCEMIA (HCC): Primary | ICD-10-CM

## 2020-06-11 LAB — SL AMB POCT HEMOGLOBIN AIC: 10.1 (ref ?–6.5)

## 2020-06-11 PROCEDURE — 99214 OFFICE O/P EST MOD 30 MIN: CPT | Performed by: FAMILY MEDICINE

## 2020-06-11 RX ORDER — ATORVASTATIN CALCIUM 40 MG/1
40 TABLET, FILM COATED ORAL DAILY
Qty: 30 TABLET | Refills: 5 | Status: SHIPPED | OUTPATIENT
Start: 2020-06-11 | End: 2021-11-05 | Stop reason: SDUPTHER

## 2020-06-11 RX ORDER — LANCETS
EACH MISCELLANEOUS
Qty: 300 EACH | Refills: 5 | Status: SHIPPED | OUTPATIENT
Start: 2020-06-11 | End: 2021-11-05 | Stop reason: SDUPTHER

## 2020-07-06 ENCOUNTER — APPOINTMENT (OUTPATIENT)
Dept: LAB | Facility: MEDICAL CENTER | Age: 25
End: 2020-07-06
Payer: COMMERCIAL

## 2020-07-06 DIAGNOSIS — Z11.4 SCREENING FOR HIV (HUMAN IMMUNODEFICIENCY VIRUS): ICD-10-CM

## 2020-07-06 DIAGNOSIS — E10.65 TYPE 1 DIABETES MELLITUS WITH HYPERGLYCEMIA (HCC): ICD-10-CM

## 2020-07-06 LAB
ALBUMIN SERPL BCP-MCNC: 4.4 G/DL (ref 3.5–5)
ALP SERPL-CCNC: 97 U/L (ref 46–116)
ALT SERPL W P-5'-P-CCNC: 24 U/L (ref 12–78)
ANION GAP SERPL CALCULATED.3IONS-SCNC: 5 MMOL/L (ref 4–13)
AST SERPL W P-5'-P-CCNC: 14 U/L (ref 5–45)
BASOPHILS # BLD AUTO: 0.02 THOUSANDS/ΜL (ref 0–0.1)
BASOPHILS NFR BLD AUTO: 0 % (ref 0–1)
BILIRUB SERPL-MCNC: 0.7 MG/DL (ref 0.2–1)
BUN SERPL-MCNC: 14 MG/DL (ref 5–25)
CALCIUM SERPL-MCNC: 10 MG/DL (ref 8.3–10.1)
CHLORIDE SERPL-SCNC: 104 MMOL/L (ref 100–108)
CHOLEST SERPL-MCNC: 209 MG/DL (ref 50–200)
CO2 SERPL-SCNC: 28 MMOL/L (ref 21–32)
CREAT SERPL-MCNC: 1.08 MG/DL (ref 0.6–1.3)
EOSINOPHIL # BLD AUTO: 0.1 THOUSAND/ΜL (ref 0–0.61)
EOSINOPHIL NFR BLD AUTO: 2 % (ref 0–6)
ERYTHROCYTE [DISTWIDTH] IN BLOOD BY AUTOMATED COUNT: 12 % (ref 11.6–15.1)
GFR SERPL CREATININE-BSD FRML MDRD: 94 ML/MIN/1.73SQ M
GLUCOSE P FAST SERPL-MCNC: 198 MG/DL (ref 65–99)
HCT VFR BLD AUTO: 46.6 % (ref 36.5–49.3)
HDLC SERPL-MCNC: 56 MG/DL
HGB BLD-MCNC: 15.4 G/DL (ref 12–17)
IMM GRANULOCYTES # BLD AUTO: 0.01 THOUSAND/UL (ref 0–0.2)
IMM GRANULOCYTES NFR BLD AUTO: 0 % (ref 0–2)
LDLC SERPL CALC-MCNC: 131 MG/DL (ref 0–100)
LYMPHOCYTES # BLD AUTO: 1.49 THOUSANDS/ΜL (ref 0.6–4.47)
LYMPHOCYTES NFR BLD AUTO: 31 % (ref 14–44)
MCH RBC QN AUTO: 28.9 PG (ref 26.8–34.3)
MCHC RBC AUTO-ENTMCNC: 33 G/DL (ref 31.4–37.4)
MCV RBC AUTO: 87 FL (ref 82–98)
MONOCYTES # BLD AUTO: 0.26 THOUSAND/ΜL (ref 0.17–1.22)
MONOCYTES NFR BLD AUTO: 5 % (ref 4–12)
NEUTROPHILS # BLD AUTO: 2.95 THOUSANDS/ΜL (ref 1.85–7.62)
NEUTS SEG NFR BLD AUTO: 62 % (ref 43–75)
NONHDLC SERPL-MCNC: 153 MG/DL
NRBC BLD AUTO-RTO: 0 /100 WBCS
PLATELET # BLD AUTO: 202 THOUSANDS/UL (ref 149–390)
PMV BLD AUTO: 11 FL (ref 8.9–12.7)
POTASSIUM SERPL-SCNC: 3.8 MMOL/L (ref 3.5–5.3)
PROT SERPL-MCNC: 8.2 G/DL (ref 6.4–8.2)
RBC # BLD AUTO: 5.33 MILLION/UL (ref 3.88–5.62)
SODIUM SERPL-SCNC: 137 MMOL/L (ref 136–145)
TRIGL SERPL-MCNC: 110 MG/DL
TSH SERPL DL<=0.05 MIU/L-ACNC: 1.52 UIU/ML (ref 0.36–3.74)
WBC # BLD AUTO: 4.83 THOUSAND/UL (ref 4.31–10.16)

## 2020-07-06 PROCEDURE — 87389 HIV-1 AG W/HIV-1&-2 AB AG IA: CPT

## 2020-07-06 PROCEDURE — 84443 ASSAY THYROID STIM HORMONE: CPT

## 2020-07-06 PROCEDURE — 36415 COLL VENOUS BLD VENIPUNCTURE: CPT

## 2020-07-06 PROCEDURE — 80061 LIPID PANEL: CPT

## 2020-07-06 PROCEDURE — 80053 COMPREHEN METABOLIC PANEL: CPT

## 2020-07-06 PROCEDURE — 85025 COMPLETE CBC W/AUTO DIFF WBC: CPT

## 2020-07-07 LAB — HIV 1+2 AB+HIV1 P24 AG SERPL QL IA: NORMAL

## 2021-01-22 DIAGNOSIS — E10.65 TYPE 1 DIABETES MELLITUS WITH HYPERGLYCEMIA (HCC): ICD-10-CM

## 2021-01-22 RX ORDER — INSULIN GLARGINE 100 [IU]/ML
15 INJECTION, SOLUTION SUBCUTANEOUS
Qty: 15 PEN | Refills: 1 | Status: SHIPPED | OUTPATIENT
Start: 2021-01-22 | End: 2021-11-02 | Stop reason: SDUPTHER

## 2021-11-02 DIAGNOSIS — E10.65 TYPE 1 DIABETES MELLITUS WITH HYPERGLYCEMIA (HCC): ICD-10-CM

## 2021-11-02 RX ORDER — INSULIN GLARGINE 100 [IU]/ML
15 INJECTION, SOLUTION SUBCUTANEOUS
Qty: 3 ML | Refills: 0 | Status: SHIPPED | OUTPATIENT
Start: 2021-11-02 | End: 2021-11-05 | Stop reason: SDUPTHER

## 2021-11-05 ENCOUNTER — OFFICE VISIT (OUTPATIENT)
Dept: FAMILY MEDICINE CLINIC | Facility: CLINIC | Age: 26
End: 2021-11-05
Payer: COMMERCIAL

## 2021-11-05 VITALS
SYSTOLIC BLOOD PRESSURE: 126 MMHG | RESPIRATION RATE: 18 BRPM | BODY MASS INDEX: 27.77 KG/M2 | HEIGHT: 72 IN | DIASTOLIC BLOOD PRESSURE: 84 MMHG | WEIGHT: 205 LBS | OXYGEN SATURATION: 99 % | TEMPERATURE: 98.9 F | HEART RATE: 82 BPM

## 2021-11-05 DIAGNOSIS — E10.65 TYPE 1 DIABETES MELLITUS WITH HYPERGLYCEMIA (HCC): Primary | ICD-10-CM

## 2021-11-05 DIAGNOSIS — Z11.59 NEED FOR HEPATITIS C SCREENING TEST: ICD-10-CM

## 2021-11-05 DIAGNOSIS — Z13.5 SCREENING FOR DIABETIC RETINOPATHY: ICD-10-CM

## 2021-11-05 LAB — SL AMB POCT HEMOGLOBIN AIC: 10.6 (ref ?–6.5)

## 2021-11-05 PROCEDURE — 99213 OFFICE O/P EST LOW 20 MIN: CPT | Performed by: FAMILY MEDICINE

## 2021-11-05 PROCEDURE — 83036 HEMOGLOBIN GLYCOSYLATED A1C: CPT | Performed by: FAMILY MEDICINE

## 2021-11-05 RX ORDER — ATORVASTATIN CALCIUM 40 MG/1
40 TABLET, FILM COATED ORAL DAILY
Qty: 90 TABLET | Refills: 3 | Status: SHIPPED | OUTPATIENT
Start: 2021-11-05 | End: 2021-11-05 | Stop reason: SDUPTHER

## 2021-11-05 RX ORDER — INSULIN GLARGINE 100 [IU]/ML
35 INJECTION, SOLUTION SUBCUTANEOUS
Qty: 12 ML | Refills: 2 | Status: SHIPPED | OUTPATIENT
Start: 2021-11-05 | End: 2021-11-05 | Stop reason: SDUPTHER

## 2021-11-05 RX ORDER — INSULIN GLARGINE 100 [IU]/ML
35 INJECTION, SOLUTION SUBCUTANEOUS
Qty: 12 ML | Refills: 2 | Status: SHIPPED | OUTPATIENT
Start: 2021-11-05 | End: 2021-12-03

## 2021-11-05 RX ORDER — BLOOD SUGAR DIAGNOSTIC
STRIP MISCELLANEOUS
Qty: 400 EACH | Refills: 5 | Status: SHIPPED | OUTPATIENT
Start: 2021-11-05

## 2021-11-05 RX ORDER — BLOOD-GLUCOSE METER
EACH MISCELLANEOUS
Qty: 1 KIT | Refills: 0 | Status: SHIPPED | OUTPATIENT
Start: 2021-11-05 | End: 2021-11-05 | Stop reason: SDUPTHER

## 2021-11-05 RX ORDER — LANCETS
EACH MISCELLANEOUS
Qty: 300 EACH | Refills: 5 | Status: SHIPPED | OUTPATIENT
Start: 2021-11-05

## 2021-11-05 RX ORDER — ATORVASTATIN CALCIUM 40 MG/1
40 TABLET, FILM COATED ORAL DAILY
Qty: 90 TABLET | Refills: 3 | Status: SHIPPED | OUTPATIENT
Start: 2021-11-05

## 2021-11-05 RX ORDER — LANCETS
EACH MISCELLANEOUS
Qty: 300 EACH | Refills: 5 | Status: SHIPPED | OUTPATIENT
Start: 2021-11-05 | End: 2021-11-05 | Stop reason: SDUPTHER

## 2021-11-05 RX ORDER — BLOOD SUGAR DIAGNOSTIC
STRIP MISCELLANEOUS
Qty: 400 EACH | Refills: 5 | Status: SHIPPED | OUTPATIENT
Start: 2021-11-05 | End: 2021-11-05 | Stop reason: SDUPTHER

## 2021-11-05 RX ORDER — BLOOD-GLUCOSE METER
EACH MISCELLANEOUS
Qty: 1 KIT | Refills: 0 | Status: SHIPPED | OUTPATIENT
Start: 2021-11-05

## 2021-12-03 ENCOUNTER — OFFICE VISIT (OUTPATIENT)
Dept: FAMILY MEDICINE CLINIC | Facility: CLINIC | Age: 26
End: 2021-12-03
Payer: COMMERCIAL

## 2021-12-03 VITALS
OXYGEN SATURATION: 97 % | HEIGHT: 72 IN | BODY MASS INDEX: 27.77 KG/M2 | TEMPERATURE: 97.6 F | HEART RATE: 82 BPM | DIASTOLIC BLOOD PRESSURE: 86 MMHG | RESPIRATION RATE: 18 BRPM | WEIGHT: 205 LBS | SYSTOLIC BLOOD PRESSURE: 120 MMHG

## 2021-12-03 DIAGNOSIS — E13.9 DIABETES MELLITUS TYPE 1.5, MANAGED AS TYPE 1 (HCC): ICD-10-CM

## 2021-12-03 PROCEDURE — 99213 OFFICE O/P EST LOW 20 MIN: CPT | Performed by: FAMILY MEDICINE

## 2021-12-03 RX ORDER — INSULIN GLARGINE 100 [IU]/ML
30 INJECTION, SOLUTION SUBCUTANEOUS
Qty: 12 ML | Refills: 2 | Status: SHIPPED | OUTPATIENT
Start: 2021-12-03

## 2021-12-09 ENCOUNTER — TELEPHONE (OUTPATIENT)
Dept: FAMILY MEDICINE CLINIC | Facility: CLINIC | Age: 26
End: 2021-12-09

## 2021-12-27 DIAGNOSIS — E13.9 DIABETES MELLITUS TYPE 1.5, MANAGED AS TYPE 1 (HCC): ICD-10-CM

## 2021-12-27 RX ORDER — INSULIN ASPART 100 [IU]/ML
INJECTION, SOLUTION INTRAVENOUS; SUBCUTANEOUS
Qty: 15 ML | Refills: 1 | Status: SHIPPED | OUTPATIENT
Start: 2021-12-27

## 2022-05-10 ENCOUNTER — VBI (OUTPATIENT)
Dept: ADMINISTRATIVE | Facility: OTHER | Age: 27
End: 2022-05-10